# Patient Record
Sex: FEMALE | Race: WHITE | NOT HISPANIC OR LATINO | Employment: STUDENT | URBAN - METROPOLITAN AREA
[De-identification: names, ages, dates, MRNs, and addresses within clinical notes are randomized per-mention and may not be internally consistent; named-entity substitution may affect disease eponyms.]

---
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
## 2024-10-14 ENCOUNTER — CLINICAL SUPPORT (OUTPATIENT)
Dept: REHABILITATION | Facility: HOSPITAL | Age: 19
End: 2024-10-14
Payer: MEDICAID

## 2024-10-14 DIAGNOSIS — R29.898 DECREASED STRENGTH OF LOWER EXTREMITY: ICD-10-CM

## 2024-10-14 DIAGNOSIS — M53.86 DECREASED ROM OF LUMBAR SPINE: Primary | ICD-10-CM

## 2024-10-14 DIAGNOSIS — R68.89 DECREASED FUNCTIONAL ACTIVITY TOLERANCE: ICD-10-CM

## 2024-10-14 PROCEDURE — 97110 THERAPEUTIC EXERCISES: CPT | Mod: PN

## 2024-10-14 PROCEDURE — 97161 PT EVAL LOW COMPLEX 20 MIN: CPT | Mod: PN
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
## 2024-10-17 NOTE — PROGRESS NOTES
[facility]   Physical Therapy Treatment Note      Name: Yaneth Roman  Clinic Number: [id number]    Therapy Diagnosis:   Encounter Diagnoses   Name Primary?    Decreased ROM of lumbar spine Yes    Decreased strength of lower extremity     Decreased functional activity tolerance      Physician: Corazon Seymour MD    Visit Date: 10/21/2024    Physician Orders: PT Eval and Treat  Medical Diagnosis from Referral: M54.50 (ICD-10-CM) - Low back pain   Evaluation Date: 10/14/2024  Authorization Period Expiration: 12/31/24  Plan of Care Expiration: 12/14/2024  Progress Note Due: 11/14/2024  Visit # / Visits authorized: 1 / 12 (1/1Eval)  FOTO: 1/3 (last performed on 10/14/2024)     Precautions: Standard and Asthma     Time In: 12:30 PM  Time Out: 1:25 PM  Total Billable Time (timed & untimed codes): 55 minutes    PTA Visit #: 0/5     Subjective     Patient reports: that she is having a pretty good day today so far. The exercises did help some. Pain in still across the lower back on both sides.    She was compliant with home exercise program.  Response to previous treatment: no adverse reactions  Functional change: in progress - first follow up appointment    Pain: 4/10  Location: bilateral lower back      Objective      Objective Measures updated at progress report unless specified.     Treatment     Yaneth received the treatments listed below:      Yaneth received therapeutic exercises to develop strength, endurance, ROM, flexibility, posture, and core stabilization for 55 minutes including:     Recumbent Bike x5 min (Level 2)  Shuttle Press Double Legs x30 reps (1 black cord, 1 red cord)  Shuttle Press Single Leg x30 reps each (1 red cord)  Supine Pelvic Tilts 3x10 reps B (3s holds)  Supine Hip Bridge 3x10 reps  Supine Piriformis Rocks 5x10s B  Supine Hamstring Stretch 10x10s B + strap  Supine LTR x10 reps + Stability Ball   Seated Figure 4 Stretch 5x10s B  Seated Lumbar Flexion x5  reps  Seated Stability Ball Roll Outs x2 min   Side-Lying Open Books B x10 reps each  Side-Lying Clamshells 2x10 reps B     Plan for Next Visit: Dry Needling, STM      Supervised modalities after being cleared for contradictions: IFC Electrical Stimulation:  Yaneth received IFC Electrical Stimulation for pain control applied to the Lower Back. Pt received stimulation at 100% scan for 8 minutes post treatment session. Yaneth tolderated treatment well without any adverse effects.  LE's elevated on wedge for comfort.    Patient received a hot pack for 8 minutes to the lower back post treatment session in combination with IFC-electrical stimulation to assist with pain relief.      *A portion of this treatment session is provided with the assistance of a skilled rehbailitation technician under the supervision of a licensed physical therapist.    Patient Education and Home Exercises       Education provided:   Home exercise program review  Post Exercise Soreness  Anatomy/Physiology of the Lumbar Spine and the surrounding musculature    Written Home Exercises Provided: Pt instructed to continue prior HEP. Exercises were reviewed and Yaneth was able to demonstrate them prior to the end of the session.  Yaneth demonstrated good  understanding of the education provided. See Electronic Medical Record under Patient Instructions for exercises provided during therapy sessions    Assessment     Emphasis placed on maintaining a pain free range of motion. Introduced posterior pelvic tilt to activate the core and protect the lumbar spine with movement. Encouraged patient to continue with Home Exercise Program daily. Dry needling may be introduced next session.    Yaneth Is progressing well towards her goals.   Patient prognosis is Good.     Patient will continue to benefit from skilled outpatient physical therapy to address the deficits listed in the problem list box on initial evaluation, provide pt/family education and to maximize  pt's level of independence in the home and community environment.     Patient's spiritual, cultural and educational needs considered and pt agreeable to plan of care and goals.     Anticipated barriers to physical therapy: none    Goals:   Reviewed: 10/14/2024    Short Term Goals: In 4 weeks  Progress Date   1.Patient to be educated on HEP. [x] Progressing  [] MET   [] Not MET   [] Not tested     2.Patient to increase right side bending range of motion to 25 degrees, in order to improve available range of motion for ADL's.  [x] Progressing  [] MET   [] Not MET  [] Not tested     3.Patient to increase hip abduction strength by 1/2 grade, in order to improve endurance and increase ability to perform all functional activities for increased time.  [x] Progressing  [] MET   [] Not MET  [] Not tested     4.Patient to have pain less than 4/10 at worst, to improve QOL. [x] Progressing  [] MET   [] Not MET  [] Not tested     5.Patient to improve score on the FOTO, to improve QOL. [x] Progressing  [] MET   [] Not MET  [] Not tested        Long Term Goals: In 8 weeks Progress Date   1.Patient to improve score on the FOTO predicted score or better, to improve QOL. [x] Progressing  [] MET   [] Not MET  [] Not tested     2. Patient to increase LE strength, especially into hip extension to at least 4+/5 or greater, in order to improve endurance and increase ability to perform all functional activities for increased time. [x] Progressing  [] MET   [] Not MET  [] Not tested     3. Patient to have decreased pain to 2/10 at worst, to improve QOL. [x] Progressing  [] MET   [] Not MET  [] Not tested     5. Patient to perform daily activities including sitting and standing for at least 30 minutes without increased symptoms. [x] Progressing  [] MET   [] Not MET  [] Not tested       Plan     Continue with established POC for improved ability to perform ADL's    Cally Medina, PT, DPT, Cert. DN

## 2024-10-21 ENCOUNTER — CLINICAL SUPPORT (OUTPATIENT)
Dept: REHABILITATION | Facility: HOSPITAL | Age: 19
End: 2024-10-21
Payer: MEDICAID

## 2024-10-21 DIAGNOSIS — R29.898 DECREASED STRENGTH OF LOWER EXTREMITY: ICD-10-CM

## 2024-10-21 DIAGNOSIS — M53.86 DECREASED ROM OF LUMBAR SPINE: Primary | ICD-10-CM

## 2024-10-21 DIAGNOSIS — R68.89 DECREASED FUNCTIONAL ACTIVITY TOLERANCE: ICD-10-CM

## 2024-10-21 PROCEDURE — 97110 THERAPEUTIC EXERCISES: CPT | Mod: PN

## 2024-10-23 NOTE — PROGRESS NOTES
[facility]   Physical Therapy Treatment Note      Name: Yaneth Roman  Clinic Number: [id number]    Therapy Diagnosis:   Encounter Diagnoses   Name Primary?    Decreased ROM of lumbar spine Yes    Decreased strength of lower extremity     Decreased functional activity tolerance      Physician: Corazon Seymour MD    Visit Date: 10/25/2024    Physician Orders: PT Eval and Treat  Medical Diagnosis from Referral: M54.50 (ICD-10-CM) - Low back pain   Evaluation Date: 10/14/2024  Authorization Period Expiration: 12/31/24  Plan of Care Expiration: 12/14/2024  Progress Note Due: 11/14/2024  Visit # / Visits authorized: 2 / 12 (1/1Eval)  FOTO: 1/3 (last performed on 10/14/2024)     Precautions: Standard and Asthma     Time In: 9:00 AM  Time Out: 9:55 AM  Total Billable Time (timed & untimed codes): 55 minutes    PTA Visit #: 0/5     Subjective     Patient reports: that she had some pain in the lower back and upper back after last session.    She was compliant with home exercise program.  Response to previous treatment: no adverse reactions  Functional change: in progress    Pain: 2/10  Location: bilateral lower back      Objective      Objective Measures updated at progress report unless specified.     Treatment     Yaneth received the treatments listed below:      Yaneth received therapeutic exercises to develop strength, endurance, ROM, flexibility, posture, and core stabilization for 45 minutes including:     Recumbent Bike x5 min (Level 2)  Shuttle Press Double Legs x30 reps (1 black cord, 1 red cord)  Shuttle Press Single Leg x30 reps each (1 red cord)  Supine Pelvic Tilts 3x10 reps B (3s holds)  Supine Hip Bridge 3x10 reps  Supine Piriformis Rocks 5x10s B  Supine Hamstring Stretch 10x10s B + strap  Supine LTR x10 reps + Stability Ball   Seated Stability Ball Roll Outs x2 min   Side-Lying Clamshells 2x10 reps B + YTB    Pt received Manual Therapy techniques in the form of Dry Needling  for x15 min. This was applied to the Lumbar Spine B at L3-L5. Dry needling was performed to decrease inflammation, increase circulation, decrease pain and restore homeostasis.      50 mm needles with 30 mm gauge were used for all insertion points. Patient prone lying for comfort. No adverse reactions observed.    Electrical Stimulation was applied this date while needles were in situ x5 minutes. Intensity increased to patient tolerance. No adverse reactions noted.    Patient gave Written and Verbal consent to undergo dry needling. Written consent can be found in the media section in pts chart. All needles were removed and changes in signs and symptoms were assessed. No adverse reactions noted at the conclusion of the treatment.        Plan for Next Visit: Dry Needling, STM      Supervised modalities after being cleared for contradictions: IFC Electrical Stimulation:  Yaneth received IFC Electrical Stimulation for pain control applied to the Lower Back. Pt received stimulation at 100% scan for 8 minutes post treatment session. Yaneth tolderated treatment well without any adverse effects.  LE's elevated on wedge for comfort.    Patient received a hot pack for 5 minutes to the lower back post treatment session in combination with IFC-electrical stimulation to assist with pain relief. LE's over wedge for comfort.      *A portion of this treatment session is provided with the assistance of a skilled rehbailitation technician under the supervision of a licensed physical therapist.    Patient Education and Home Exercises       Education provided:   Home exercise program review  Post Exercise Soreness  Anatomy/Physiology of the Lumbar Spine and the surrounding musculature    Written Home Exercises Provided: Pt instructed to continue prior HEP. Exercises were reviewed and Yaneth was able to demonstrate them prior to the end of the session.  Yaneth demonstrated good  understanding of the education provided. See Electronic  Medical Record under Patient Instructions for exercises provided during therapy sessions    Assessment     Patient tolerated therapy fairly well overall. Continues to have some pain in the lower back bilaterally. Dry needling was introduced with emphasis on tissue extensibility and pain relief. Skin was cleaned pre and post needle insertion/removal with no adverse reactions observed. Will continue to progress patient as able next session.    Yaneth Is progressing well towards her goals.   Patient prognosis is Good.     Patient will continue to benefit from skilled outpatient physical therapy to address the deficits listed in the problem list box on initial evaluation, provide pt/family education and to maximize pt's level of independence in the home and community environment.     Patient's spiritual, cultural and educational needs considered and pt agreeable to plan of care and goals.     Anticipated barriers to physical therapy: none    Goals:   Reviewed: 10/14/2024    Short Term Goals: In 4 weeks  Progress Date   1.Patient to be educated on HEP. [x] Progressing  [] MET   [] Not MET   [] Not tested     2.Patient to increase right side bending range of motion to 25 degrees, in order to improve available range of motion for ADL's.  [x] Progressing  [] MET   [] Not MET  [] Not tested     3.Patient to increase hip abduction strength by 1/2 grade, in order to improve endurance and increase ability to perform all functional activities for increased time.  [x] Progressing  [] MET   [] Not MET  [] Not tested     4.Patient to have pain less than 4/10 at worst, to improve QOL. [x] Progressing  [] MET   [] Not MET  [] Not tested     5.Patient to improve score on the FOTO, to improve QOL. [x] Progressing  [] MET   [] Not MET  [] Not tested        Long Term Goals: In 8 weeks Progress Date   1.Patient to improve score on the FOTO predicted score or better, to improve QOL. [x] Progressing  [] MET   [] Not MET  [] Not tested      2. Patient to increase LE strength, especially into hip extension to at least 4+/5 or greater, in order to improve endurance and increase ability to perform all functional activities for increased time. [x] Progressing  [] MET   [] Not MET  [] Not tested     3. Patient to have decreased pain to 2/10 at worst, to improve QOL. [x] Progressing  [] MET   [] Not MET  [] Not tested     5. Patient to perform daily activities including sitting and standing for at least 30 minutes without increased symptoms. [x] Progressing  [] MET   [] Not MET  [] Not tested       Plan     Continue with established POC for improved ability to perform ADL's    Cally Medina, PT, DPT, Cert. DN

## 2024-10-25 ENCOUNTER — CLINICAL SUPPORT (OUTPATIENT)
Dept: REHABILITATION | Facility: HOSPITAL | Age: 19
End: 2024-10-25
Payer: MEDICAID

## 2024-10-25 DIAGNOSIS — M53.86 DECREASED ROM OF LUMBAR SPINE: Primary | ICD-10-CM

## 2024-10-25 DIAGNOSIS — R68.89 DECREASED FUNCTIONAL ACTIVITY TOLERANCE: ICD-10-CM

## 2024-10-25 DIAGNOSIS — R29.898 DECREASED STRENGTH OF LOWER EXTREMITY: ICD-10-CM

## 2024-10-25 PROCEDURE — 97110 THERAPEUTIC EXERCISES: CPT | Mod: PN

## 2024-10-29 ENCOUNTER — CLINICAL SUPPORT (OUTPATIENT)
Dept: REHABILITATION | Facility: HOSPITAL | Age: 19
End: 2024-10-29
Payer: MEDICAID

## 2024-10-29 DIAGNOSIS — R68.89 DECREASED FUNCTIONAL ACTIVITY TOLERANCE: ICD-10-CM

## 2024-10-29 DIAGNOSIS — R29.898 DECREASED STRENGTH OF LOWER EXTREMITY: ICD-10-CM

## 2024-10-29 DIAGNOSIS — M53.86 DECREASED ROM OF LUMBAR SPINE: Primary | ICD-10-CM

## 2024-10-29 PROCEDURE — 97110 THERAPEUTIC EXERCISES: CPT | Mod: PN

## 2024-10-31 NOTE — PROGRESS NOTES
[facility]   Physical Therapy Treatment Note      Name: Yaneth Roman  Clinic Number: [id number]    Therapy Diagnosis:   Encounter Diagnoses   Name Primary?    Decreased ROM of lumbar spine Yes    Decreased strength of lower extremity     Decreased functional activity tolerance      Physician: Corazon Seymour MD    Visit Date: 11/4/2024    Physician Orders: PT Eval and Treat  Medical Diagnosis from Referral: M54.50 (ICD-10-CM) - Low back pain   Evaluation Date: 10/14/2024  Authorization Period Expiration: 12/31/24  Plan of Care Expiration: 12/14/2024  Progress Note Due: 11/14/2024  Visit # / Visits authorized: 4 / 12 (1/1Eval)  FOTO: 1/3 (last performed on 10/14/2024)     Precautions: Standard and Asthma     Time In: 2:30 PM  Time Out: 2:23 PM  Total Billable Time (timed & untimed codes): 53 minutes    PTA Visit #: 0/5     Subjective     Patient reports: that she is having more back pain today - did a lot of sitting in the center of the back. The cupping has helped.     She was compliant with home exercise program.  Response to previous treatment: no adverse reactions  Functional change: in progress    Pain: 7/10  Location: bilateral lower back      Objective      Objective Measures updated at progress report unless specified.     Treatment     Yaneth received the treatments listed below:      Yaneth received therapeutic exercises to develop strength, endurance, ROM, flexibility, posture, and core stabilization for 53 minutes including:     Recumbent Bike x5 min (Level 4)  Shuttle Press Double Legs x30 reps (1 black cord, 1 red cord)  Shuttle Press Single Leg x30 reps each (1 red cord)  Standing Lat Pull Downs 2x10 reps + GTB  Supine Pelvic Tilts 3x10 reps B (3s holds) + BKFO  Supine Hip Bridge 3x10 reps + iso hip abduction  Supine Piriformis Rocks 5x10s B  Supine Hamstring Stretch 5x10s B + strap  Supine LTR x10 reps + Stability Ball   Seated Stability Ball Roll Outs x2 min    Side-Lying Clamshells 2x10 reps B + YTB  Cupping and STM to the Lumbar paraspinals - pillow under hips         Plan for Next Visit: Dry Needling, STM        Patient received a hot pack for 8 minutes to the lower back post treatment session to assist with pain relief. LE's over wedge for comfort.      *A portion of this treatment session is provided with the assistance of a skilled rehbailitation technician under the supervision of a licensed physical therapist.    Patient Education and Home Exercises       Education provided:   Home exercise program review  Post Exercise Soreness  Anatomy/Physiology of the Lumbar Spine and the surrounding musculature    Written Home Exercises Provided: Pt instructed to continue prior HEP. Exercises were reviewed and Yaneth was able to demonstrate them prior to the end of the session.  Yaneth demonstrated good  understanding of the education provided. See Electronic Medical Record under Patient Instructions for exercises provided during therapy sessions    Assessment     Patient tolerated therapy fairly well overall this afternoon. Cupping continues to yield a positive response - noted mild to moderate TTP along the lower lumbar paraspinals. Encouraged patient to continue with Home Exercise Program daily.     Yaneth Is progressing well towards her goals.   Patient prognosis is Good.     Patient will continue to benefit from skilled outpatient physical therapy to address the deficits listed in the problem list box on initial evaluation, provide pt/family education and to maximize pt's level of independence in the home and community environment.     Patient's spiritual, cultural and educational needs considered and pt agreeable to plan of care and goals.     Anticipated barriers to physical therapy: none    Goals:   Reviewed: 10/14/2024    Short Term Goals: In 4 weeks  Progress Date   1.Patient to be educated on HEP. [x] Progressing  [] MET   [] Not MET   [] Not tested     2.Patient  to increase right side bending range of motion to 25 degrees, in order to improve available range of motion for ADL's.  [x] Progressing  [] MET   [] Not MET  [] Not tested     3.Patient to increase hip abduction strength by 1/2 grade, in order to improve endurance and increase ability to perform all functional activities for increased time.  [x] Progressing  [] MET   [] Not MET  [] Not tested     4.Patient to have pain less than 4/10 at worst, to improve QOL. [x] Progressing  [] MET   [] Not MET  [] Not tested     5.Patient to improve score on the FOTO, to improve QOL. [x] Progressing  [] MET   [] Not MET  [] Not tested        Long Term Goals: In 8 weeks Progress Date   1.Patient to improve score on the FOTO predicted score or better, to improve QOL. [x] Progressing  [] MET   [] Not MET  [] Not tested     2. Patient to increase LE strength, especially into hip extension to at least 4+/5 or greater, in order to improve endurance and increase ability to perform all functional activities for increased time. [x] Progressing  [] MET   [] Not MET  [] Not tested     3. Patient to have decreased pain to 2/10 at worst, to improve QOL. [x] Progressing  [] MET   [] Not MET  [] Not tested     5. Patient to perform daily activities including sitting and standing for at least 30 minutes without increased symptoms. [x] Progressing  [] MET   [] Not MET  [] Not tested       Plan     Continue with established POC for improved ability to perform ADL's    Cally Mdeina, PT, DPT, Cert. DN

## 2024-11-04 ENCOUNTER — CLINICAL SUPPORT (OUTPATIENT)
Dept: REHABILITATION | Facility: HOSPITAL | Age: 19
End: 2024-11-04
Payer: MEDICAID

## 2024-11-04 DIAGNOSIS — M53.86 DECREASED ROM OF LUMBAR SPINE: Primary | ICD-10-CM

## 2024-11-04 DIAGNOSIS — R68.89 DECREASED FUNCTIONAL ACTIVITY TOLERANCE: ICD-10-CM

## 2024-11-04 DIAGNOSIS — R29.898 DECREASED STRENGTH OF LOWER EXTREMITY: ICD-10-CM

## 2024-11-04 PROCEDURE — 97110 THERAPEUTIC EXERCISES: CPT | Mod: PN

## 2024-11-05 NOTE — PROGRESS NOTES
[facility]   Physical Therapy Treatment Note      Name: Yaneth Roman  Clinic Number: [id number]    Therapy Diagnosis:   Encounter Diagnoses   Name Primary?    Decreased ROM of lumbar spine Yes    Decreased strength of lower extremity     Decreased functional activity tolerance      Physician: Corazon Seymour MD    Visit Date: 11/6/2024    Physician Orders: PT Eval and Treat  Medical Diagnosis from Referral: M54.50 (ICD-10-CM) - Low back pain   Evaluation Date: 10/14/2024  Authorization Period Expiration: 12/31/24  Plan of Care Expiration: 12/14/2024  Progress Note Due: 11/14/2024  Visit # / Visits authorized: 5 / 12 (1/1Eval)  FOTO: 1/3 (last performed on 10/14/2024)     Precautions: Standard and Asthma     Time In: 3:00 PM  Time Out: 3:53 PM  Total Billable Time (timed & untimed codes): 53 minutes    PTA Visit #: 0/5     Subjective     Patient reports: that she did a lot of sitting yesterday but she is not having any pain this afternoon. Feels like the symptoms are getting better in general.    She was compliant with home exercise program.  Response to previous treatment: no adverse reactions  Functional change: in progress    Pain: 0/10  Location: bilateral lower back      Objective      Objective Measures updated at progress report unless specified.     Treatment     Yaneth received the treatments listed below:      Yaneth received therapeutic exercises to develop strength, endurance, ROM, flexibility, posture, and core stabilization for 53 minutes including:     Recumbent Bike x6 min (Level 4)  Shuttle Press Double Legs x30 reps (1 black cord, 1 red cord)  Shuttle Press Single Leg x30 reps each (1 red cord)  Standing Lat Pull Downs 3x10 reps + GTB  Quadruped PPT x20 reps (3s holds)  Bird Dog x10 reps B  Supine Pelvic Tilts 2x10 reps B (3s holds) + BKFO + RTB  Supine Hip Bridge 2x10 reps + hip abduction + RTB  Supine Piriformis Rocks 5x10s B  Supine Hamstring Stretch 5x10s B +  strap  Supine LTR x10 reps + Stability Ball   Cupping and STM to the Lumbar paraspinals - pillow under hips         Plan for Next Visit: Dry Needling, STM        Patient received a hot pack for 8 minutes to the lower back post treatment session to assist with pain relief. LE's over wedge for comfort.      *A portion of this treatment session is provided with the assistance of a skilled rehbailitation technician under the supervision of a licensed physical therapist.    Patient Education and Home Exercises       Education provided:   Home exercise program review  Post Exercise Soreness  Anatomy/Physiology of the Lumbar Spine and the surrounding musculature    Written Home Exercises Provided: Pt instructed to continue prior HEP. Exercises were reviewed and Yaneth was able to demonstrate them prior to the end of the session.  Yaneth demonstrated good  understanding of the education provided. See Electronic Medical Record under Patient Instructions for exercises provided during therapy sessions    Assessment     Patient continues to tolerate therapy well. Introduced quadruped exercises with emphasis on greater core engagement and control. Lumbopelvic mobility improved with increased reps. Good tolerance to cupping again this date - noted improved tissue extensibility overall.    Yaneth Is progressing well towards her goals.   Patient prognosis is Good.     Patient will continue to benefit from skilled outpatient physical therapy to address the deficits listed in the problem list box on initial evaluation, provide pt/family education and to maximize pt's level of independence in the home and community environment.     Patient's spiritual, cultural and educational needs considered and pt agreeable to plan of care and goals.     Anticipated barriers to physical therapy: none    Goals:   Reviewed: 10/14/2024    Short Term Goals: In 4 weeks  Progress Date   1.Patient to be educated on HEP. [x] Progressing  [] MET   [] Not  MET   [] Not tested     2.Patient to increase right side bending range of motion to 25 degrees, in order to improve available range of motion for ADL's.  [x] Progressing  [] MET   [] Not MET  [] Not tested     3.Patient to increase hip abduction strength by 1/2 grade, in order to improve endurance and increase ability to perform all functional activities for increased time.  [x] Progressing  [] MET   [] Not MET  [] Not tested     4.Patient to have pain less than 4/10 at worst, to improve QOL. [x] Progressing  [] MET   [] Not MET  [] Not tested     5.Patient to improve score on the FOTO, to improve QOL. [x] Progressing  [] MET   [] Not MET  [] Not tested        Long Term Goals: In 8 weeks Progress Date   1.Patient to improve score on the FOTO predicted score or better, to improve QOL. [x] Progressing  [] MET   [] Not MET  [] Not tested     2. Patient to increase LE strength, especially into hip extension to at least 4+/5 or greater, in order to improve endurance and increase ability to perform all functional activities for increased time. [x] Progressing  [] MET   [] Not MET  [] Not tested     3. Patient to have decreased pain to 2/10 at worst, to improve QOL. [x] Progressing  [] MET   [] Not MET  [] Not tested     5. Patient to perform daily activities including sitting and standing for at least 30 minutes without increased symptoms. [x] Progressing  [] MET   [] Not MET  [] Not tested       Plan     Continue with established POC for improved ability to perform ADL's    Cally Medina, PT, DPT, Cert. DN

## 2024-11-06 ENCOUNTER — CLINICAL SUPPORT (OUTPATIENT)
Dept: REHABILITATION | Facility: HOSPITAL | Age: 19
End: 2024-11-06
Payer: MEDICAID

## 2024-11-06 DIAGNOSIS — R68.89 DECREASED FUNCTIONAL ACTIVITY TOLERANCE: ICD-10-CM

## 2024-11-06 DIAGNOSIS — R29.898 DECREASED STRENGTH OF LOWER EXTREMITY: ICD-10-CM

## 2024-11-06 DIAGNOSIS — M53.86 DECREASED ROM OF LUMBAR SPINE: Primary | ICD-10-CM

## 2024-11-06 PROCEDURE — 97110 THERAPEUTIC EXERCISES: CPT | Mod: PN

## 2024-11-10 NOTE — PROGRESS NOTES
[facility]   Physical Therapy Treatment Note      Name: Yaneth Roman  Clinic Number: [id number]    Therapy Diagnosis:   Encounter Diagnoses   Name Primary?    Decreased ROM of lumbar spine Yes    Decreased strength of lower extremity     Decreased functional activity tolerance      Physician: Corazon Seymour MD    Visit Date: 11/12/2024    Physician Orders: PT Eval and Treat  Medical Diagnosis from Referral: M54.50 (ICD-10-CM) - Low back pain   Evaluation Date: 10/14/2024  Authorization Period Expiration: 12/31/24  Plan of Care Expiration: 12/14/2024  Progress Note Due: 11/14/2024  Visit # / Visits authorized: 6 / 12 (1/1Eval)  FOTO: 1/3 (last performed on 10/14/2024)     Precautions: Standard and Asthma     Time In: 7:00 AM  Time Out: 7:53 AM  Total Billable Time (timed & untimed codes): 53 minutes    PTA Visit #: 0/5     Subjective     Patient reports: that she can tell her pain is getting better overall. Only really having pain first thing in the morning and sometimes before bed at night.     She was compliant with home exercise program.  Response to previous treatment: no adverse reactions  Functional change: in progress    Pain: 3/10  Location: bilateral lower back      Objective      Objective Measures updated at progress report unless specified.     Treatment     Yaneth received the treatments listed below:      Yaneth received therapeutic exercises to develop strength, endurance, ROM, flexibility, posture, and core stabilization for 53 minutes including:     Recumbent Bike x5 min (Level 4)  Shuttle Press Double Legs x30 reps (1 black cord, 1 red cord)  Shuttle Press Single Leg x30 reps each (1 red cord)  Side-Lying Clamshells 2x10 reps B + RTB  Quadruped PPT x20 reps (3s holds)  Quadruped Bird Dog x10 reps B  Supine PPT 2x10 reps + LE March + RTB  Supine Hip Bridge 2x10 reps + hip abduction + RTB  Supine Piriformis Rocks 5x10s B  Supine Hamstring Stretch 5x10s B +  strap  Supine LTR x10 reps + Stability Ball   Cupping and STM to the Lumbar paraspinals - pillow under hips    Plan for Next Visit:         Patient received a hot pack for 8 minutes to the lower back post treatment session to assist with pain relief. Prone lying for comfort with pillow under hips.      *A portion of this treatment session is provided with the assistance of a skilled rehbailitation technician under the supervision of a licensed physical therapist.    Patient Education and Home Exercises       Education provided:   Home exercise program review  Post Exercise Soreness  Anatomy/Physiology of the Lumbar Spine and the surrounding musculature    Written Home Exercises Provided: Pt instructed to continue prior HEP. Exercises were reviewed and Yaneth was able to demonstrate them prior to the end of the session.  Yaneth demonstrated good  understanding of the education provided. See Electronic Medical Record under Patient Instructions for exercises provided during therapy sessions    Assessment     Patient with good overall tolerance to exercise this date. Emphasis placed on tissue extensibility and core strengthening through activity. Neutral spine maintained after initial cues with bird dog's. Will continue to progress patient as able next session.    Yaneth Is progressing well towards her goals.   Patient prognosis is Good.     Patient will continue to benefit from skilled outpatient physical therapy to address the deficits listed in the problem list box on initial evaluation, provide pt/family education and to maximize pt's level of independence in the home and community environment.     Patient's spiritual, cultural and educational needs considered and pt agreeable to plan of care and goals.     Anticipated barriers to physical therapy: none    Goals:   Reviewed: 10/14/2024    Short Term Goals: In 4 weeks  Progress Date   1.Patient to be educated on HEP. [x] Progressing  [] MET   [] Not MET   [] Not  tested     2.Patient to increase right side bending range of motion to 25 degrees, in order to improve available range of motion for ADL's.  [x] Progressing  [] MET   [] Not MET  [] Not tested     3.Patient to increase hip abduction strength by 1/2 grade, in order to improve endurance and increase ability to perform all functional activities for increased time.  [x] Progressing  [] MET   [] Not MET  [] Not tested     4.Patient to have pain less than 4/10 at worst, to improve QOL. [x] Progressing  [] MET   [] Not MET  [] Not tested     5.Patient to improve score on the FOTO, to improve QOL. [x] Progressing  [] MET   [] Not MET  [] Not tested        Long Term Goals: In 8 weeks Progress Date   1.Patient to improve score on the FOTO predicted score or better, to improve QOL. [x] Progressing  [] MET   [] Not MET  [] Not tested     2. Patient to increase LE strength, especially into hip extension to at least 4+/5 or greater, in order to improve endurance and increase ability to perform all functional activities for increased time. [x] Progressing  [] MET   [] Not MET  [] Not tested     3. Patient to have decreased pain to 2/10 at worst, to improve QOL. [x] Progressing  [] MET   [] Not MET  [] Not tested     5. Patient to perform daily activities including sitting and standing for at least 30 minutes without increased symptoms. [x] Progressing  [] MET   [] Not MET  [] Not tested       Plan     Continue with established POC for improved ability to perform ADL's    Cally Medina, PT, DPT, Cert. DN

## 2024-11-12 ENCOUNTER — CLINICAL SUPPORT (OUTPATIENT)
Dept: REHABILITATION | Facility: HOSPITAL | Age: 19
End: 2024-11-12
Payer: MEDICAID

## 2024-11-12 DIAGNOSIS — M53.86 DECREASED ROM OF LUMBAR SPINE: Primary | ICD-10-CM

## 2024-11-12 DIAGNOSIS — R68.89 DECREASED FUNCTIONAL ACTIVITY TOLERANCE: ICD-10-CM

## 2024-11-12 DIAGNOSIS — R29.898 DECREASED STRENGTH OF LOWER EXTREMITY: ICD-10-CM

## 2024-11-12 PROCEDURE — 97110 THERAPEUTIC EXERCISES: CPT | Mod: PN

## 2024-11-12 NOTE — PROGRESS NOTES
[facility]   Physical Therapy Treatment Note      Name: Yaneth Roman  Clinic Number: [id number]    Therapy Diagnosis:   Encounter Diagnoses   Name Primary?    Decreased ROM of lumbar spine Yes    Decreased strength of lower extremity     Decreased functional activity tolerance      Physician: Corazon Seymour MD    Visit Date: 11/13/2024    Physician Orders: PT Eval and Treat  Medical Diagnosis from Referral: M54.50 (ICD-10-CM) - Low back pain   Evaluation Date: 10/14/2024  Authorization Period Expiration: 12/31/24  Plan of Care Expiration: 12/14/2024  Progress Note Due: 11/14/2024  Visit # / Visits authorized: 7 / 12 (1/1Eval)  FOTO: 2/3 (last performed on 10/14/2024, 11/13/24)     Precautions: Standard and Asthma     Time In: 3:00 PM  Time Out: 3:45 PM  Total Billable Time (timed & untimed codes): 45 minutes    PTA Visit #: 0/5     Subjective     Patient reports: that she is a little sore from yesterday with mild pain/discomfort. Pain in the back is more on the left side today.    She was compliant with home exercise program.  Response to previous treatment: no adverse reactions  Functional change: in progress    Pain: 4/10  Location: bilateral lower back (L>R today)    Objective      Objective Measures updated at progress report unless specified.     FOTO: 60    Treatment     Yaneth received the treatments listed below:      Yaneth received therapeutic exercises to develop strength, endurance, ROM, flexibility, posture, and core stabilization for 45 minutes including:     Recumbent Bike x5 min (Level 4)  Shuttle Press Double Legs x30 reps (1 black cord, 1 red cord)  Shuttle Press Single Leg x30 reps each (1 red cord)  Quadruped PPT x20 reps (3s holds)  Quadruped Bird Dog x10 reps B  Quadruped Fire Hydrants x10 reps B  Quadruped Donkey Kicks x10 reps B  Supine Piriformis Rocks 5x10s B  Cupping and STM to the Lumbar paraspinals - pillow under hips    Plan for Next Visit:          Patient received a hot pack for 8 minutes to the lower back post treatment session to assist with pain relief. Prone lying for comfort with pillow under hips.      *A portion of this treatment session is provided with the assistance of a skilled rehbailitation technician under the supervision of a licensed physical therapist.    Patient Education and Home Exercises       Education provided:   Home exercise program review  Post Exercise Soreness  Anatomy/Physiology of the Lumbar Spine and the surrounding musculature    Written Home Exercises Provided: Pt instructed to continue prior HEP. Exercises were reviewed and Yaneth was able to demonstrate them prior to the end of the session.  Yaneth demonstrated good  understanding of the education provided. See Electronic Medical Record under Patient Instructions for exercises provided during therapy sessions    Assessment     Introduced quadruped progressions with emphasis on glute, hip, and core activation while maintaining a neutral spine. Core fatigue noted with quadruped exercises but good tolerance noted overall. Noted improved FOTO score indicative of increased ability to perform functional activities.    Yaneth Is progressing well towards her goals.   Patient prognosis is Good.     Patient will continue to benefit from skilled outpatient physical therapy to address the deficits listed in the problem list box on initial evaluation, provide pt/family education and to maximize pt's level of independence in the home and community environment.     Patient's spiritual, cultural and educational needs considered and pt agreeable to plan of care and goals.     Anticipated barriers to physical therapy: none    Goals:   Reviewed: 10/14/2024    Short Term Goals: In 4 weeks  Progress Date   1.Patient to be educated on HEP. [x] Progressing  [] MET   [] Not MET   [] Not tested     2.Patient to increase right side bending range of motion to 25 degrees, in order to improve  available range of motion for ADL's.  [x] Progressing  [] MET   [] Not MET  [] Not tested     3.Patient to increase hip abduction strength by 1/2 grade, in order to improve endurance and increase ability to perform all functional activities for increased time.  [x] Progressing  [] MET   [] Not MET  [] Not tested     4.Patient to have pain less than 4/10 at worst, to improve QOL. [x] Progressing  [] MET   [] Not MET  [] Not tested     5.Patient to improve score on the FOTO, to improve QOL. [] Progressing  [x] MET   [] Not MET  [] Not tested  11/13/24      Long Term Goals: In 8 weeks Progress Date   1.Patient to improve score on the FOTO predicted score or better, to improve QOL. [x] Progressing  [] MET   [] Not MET  [] Not tested     2. Patient to increase LE strength, especially into hip extension to at least 4+/5 or greater, in order to improve endurance and increase ability to perform all functional activities for increased time. [x] Progressing  [] MET   [] Not MET  [] Not tested     3. Patient to have decreased pain to 2/10 at worst, to improve QOL. [x] Progressing  [] MET   [] Not MET  [] Not tested     5. Patient to perform daily activities including sitting and standing for at least 30 minutes without increased symptoms. [x] Progressing  [] MET   [] Not MET  [] Not tested       Plan     Continue with established POC for improved ability to perform ADL's    Cally Medina, PT, DPT, Cert. DN

## 2024-11-13 ENCOUNTER — CLINICAL SUPPORT (OUTPATIENT)
Dept: REHABILITATION | Facility: HOSPITAL | Age: 19
End: 2024-11-13
Payer: MEDICAID

## 2024-11-13 DIAGNOSIS — M53.86 DECREASED ROM OF LUMBAR SPINE: Primary | ICD-10-CM

## 2024-11-13 DIAGNOSIS — R68.89 DECREASED FUNCTIONAL ACTIVITY TOLERANCE: ICD-10-CM

## 2024-11-13 DIAGNOSIS — R29.898 DECREASED STRENGTH OF LOWER EXTREMITY: ICD-10-CM

## 2024-11-13 PROCEDURE — 97110 THERAPEUTIC EXERCISES: CPT | Mod: PN

## 2024-11-14 NOTE — PROGRESS NOTES
[facility]   Physical Therapy Treatment Note      Name: Yaneth Roman  Clinic Number: [id number]    Therapy Diagnosis:   No diagnosis found.    Physician: Corazon Seymour MD    Visit Date: 11/18/2024    Physician Orders: PT Eval and Treat  Medical Diagnosis from Referral: M54.50 (ICD-10-CM) - Low back pain   Evaluation Date: 10/14/2024  Authorization Period Expiration: 12/31/24  Plan of Care Expiration: 12/14/2024  Progress Note Due: 11/14/2024  Visit # / Visits authorized: 8 / 12 (1/1Eval)  FOTO: 2/3 (last performed on 10/14/2024, 11/13/24)     Precautions: Standard and Asthma     Time In: 9:00 AM  Time Out: 9:45 AM  Total Billable Time (timed & untimed codes): 45 minutes    PTA Visit #: 0/5     Subjective     Patient reports: that she is having more tailbone pain this morning, especially when sitting straight up.    She was compliant with home exercise program.  Response to previous treatment: no adverse reactions  Functional change: in progress    Pain: 3/10  Location: bilateral lower back (L>R today)    Objective      Objective Measures updated at progress report unless specified.     Treatment     Yaneth received the treatments listed below:      Yaneth received therapeutic exercises to develop strength, endurance, ROM, flexibility, posture, and core stabilization for 45 minutes including:     Recumbent Bike x6 min (Level 4)  Supine Piriformis [place]ocks 5x10s B  Shuttle Press Double Legs x30 reps (1 black cord, 1 red cord)  Shuttle Press Single Leg x30 reps each (1 red cord)  Quadruped PPT x20 reps (3s holds)  Quadruped Bird Dog x10 reps B  Quadruped Fire Hydrants x10 reps B  Quadruped Donkey Kicks x10 reps B  Cupping and STM to the Lumbar paraspinals - pillow under hips    Plan for Next Visit:       Supervised modalities after being cleared for contradictions: IFC Electrical Stimulation:  Yaneth received IFC Electrical Stimulation for pain control applied to the Lower Back. Pt  received stimulation at 100% scan for 8 minutes post treatment session. Yaneth tolderated treatment well without any adverse effects.  LE's elevated on wedge for comfort.    Patient received a hot pack for 8 minutes to the lower back post treatment session in combination with IFC-electrical stimulation to assist with pain relief. Prone lying for comfort with pillow under hips.      *A portion of this treatment session is provided with the assistance of a skilled rehbailitation technician under the supervision of a licensed physical therapist.    Patient Education and Home Exercises       Education provided:   Home exercise program review  Post Exercise Soreness  Anatomy/Physiology of the Lumbar Spine and the surrounding musculature    Written Home Exercises Provided: Pt instructed to continue prior HEP. Exercises were reviewed and Yaneth was able to demonstrate them prior to the end of the session.  Yaneth demonstrated good  understanding of the education provided. See Electronic Medical Record under Patient Instructions for exercises provided during therapy sessions    Assessment     Patient with increased tailbone pain this date but was able to complete exercises as prescribed. Cupping and soft tissue mobilization to the lower lumbar paraspinals continues to feel beneficial. IFC electrical stimulation was also applied today post treatment session to further assist with pain relief and tissue extensibility. No adverse reactions reported. Patient reported decreased pain post treatment session. Plan to re-assess the patient next session.    Yaneth Is progressing well towards her goals.   Patient prognosis is Good.     Patient will continue to benefit from skilled outpatient physical therapy to address the deficits listed in the problem list box on initial evaluation, provide pt/family education and to maximize pt's level of independence in the home and community environment.     Patient's spiritual, cultural and  educational needs considered and pt agreeable to plan of care and goals.     Anticipated barriers to physical therapy: none    Goals:   Reviewed: 10/14/2024    Short Term Goals: In 4 weeks  Progress Date   1.Patient to be educated on HEP. [x] Progressing  [] MET   [] Not MET   [] Not tested     2.Patient to increase right side bending range of motion to 25 degrees, in order to improve available range of motion for ADL's.  [x] Progressing  [] MET   [] Not MET  [] Not tested     3.Patient to increase hip abduction strength by 1/2 grade, in order to improve endurance and increase ability to perform all functional activities for increased time.  [x] Progressing  [] MET   [] Not MET  [] Not tested     4.Patient to have pain less than 4/10 at worst, to improve QOL. [x] Progressing  [] MET   [] Not MET  [] Not tested     5.Patient to improve score on the FOTO, to improve QOL. [] Progressing  [x] MET   [] Not MET  [] Not tested  11/13/24      Long Term Goals: In 8 weeks Progress Date   1.Patient to improve score on the FOTO predicted score or better, to improve QOL. [x] Progressing  [] MET   [] Not MET  [] Not tested     2. Patient to increase LE strength, especially into hip extension to at least 4+/5 or greater, in order to improve endurance and increase ability to perform all functional activities for increased time. [x] Progressing  [] MET   [] Not MET  [] Not tested     3. Patient to have decreased pain to 2/10 at worst, to improve QOL. [x] Progressing  [] MET   [] Not MET  [] Not tested     5. Patient to perform daily activities including sitting and standing for at least 30 minutes without increased symptoms. [x] Progressing  [] MET   [] Not MET  [] Not tested       Plan     Continue with established POC for improved ability to perform ADL's    Cally Medina, PT, DPT, Cert. DN

## 2024-11-18 ENCOUNTER — CLINICAL SUPPORT (OUTPATIENT)
Dept: REHABILITATION | Facility: HOSPITAL | Age: 19
End: 2024-11-18
Payer: MEDICAID

## 2024-11-18 DIAGNOSIS — M53.86 DECREASED ROM OF LUMBAR SPINE: Primary | ICD-10-CM

## 2024-11-18 DIAGNOSIS — R29.898 DECREASED STRENGTH OF LOWER EXTREMITY: ICD-10-CM

## 2024-11-18 DIAGNOSIS — R68.89 DECREASED FUNCTIONAL ACTIVITY TOLERANCE: ICD-10-CM

## 2024-11-18 PROCEDURE — 97110 THERAPEUTIC EXERCISES: CPT | Mod: PN

## 2024-11-21 NOTE — PROGRESS NOTES
[facility]   Physical Therapy Treatment Note & Re-assessment     Name: Yaneth Roman  Clinic Number: [id number]    Therapy Diagnosis:   Encounter Diagnoses   Name Primary?    Decreased ROM of lumbar spine Yes    Decreased strength of lower extremity     Decreased functional activity tolerance      Physician: Corazon Seymour MD    Visit Date: 11/22/2024    Physician Orders: PT Eval and Treat  Medical Diagnosis from Referral: M54.50 (ICD-10-CM) - Low back pain   Evaluation Date: 10/14/2024  Authorization Period Expiration: 12/31/24  Plan of Care Expiration: 12/14/2024  Progress Note Due: 12/22/24  Visit # / Visits authorized: 9 / 12 (1/1Eval) (re-assessed on 11/22/24)  FOTO: 2/3 (last performed on 10/14/2024, 11/13/24)     Precautions: Standard and Asthma     Time In: 8:00 AM  Time Out: 8:53 AM  Total Billable Time (timed & untimed codes): 53 minutes    PTA Visit #: 0/5     Subjective     Patient reports: that her tailbone pain is better today and she can tell that therapy has helped. Her pain has decreased in general. Sitting for long periods of time is easier and she can bend over without pain.    She was compliant with home exercise program.  Response to previous treatment: no adverse reactions  Functional change: in progress    Pain: 3/10  Location: bilateral lower back (L>R today)    Objective      Objective Measures updated at progress report unless specified.     Posture:  Patient presents with postural abnormalities which include:               [] Forward Head                                [] Increased Lumbar Lordosis              [] Rounded Shoulder                         [] Flat Back Posture              [] Increased Thoracic Kyphosis        [] Pes Planus              [] Increased Trunk Sway                   [] Valgus knee position              [] Increased Trunk Rotation              [] Varus knee position              [] Increased cervical lordosis            [x] Other:  None Noted     Sensation:    Sensation to light touch over UE's is  [x] Intact [] Impaired [] Defer  Sensation to light touch over LE's is  [x] Intact [] Impaired [] Defer     Reflexes:  Patellar                        [x] Defer [] Normal [] Hyper []  Hypo   Achilles                        [x] Defer [] Normal [] Hyper []  Hypo  Tricep                          [x] Defer [] Normal [] Hyper []  Hypo  Brachioradialis            [x] Defer [] Normal [] Hyper []  Hypo        Gait Analysis: The patient ambulated with the following assistive device: none; the patient presents with the following gait abnormalities: steady gait      Balance  Right   (seconds) Left  (seconds) Norms   Single Leg Stance 10s 10s Less than 4.9 sec high risk  5-6.4 sec increased risk  6.5 or greater low risk                Range of Motion:  Lumbar ROM Right  (spine) Left    Pain/Dysfunction with Movement   Lumbar Flexion (60º) 60 --- -   Lumbar Extension (30º) 30 --- - discomfort only   Lumbar Side Bending (25º) 20 25 Pain on R   Lumbar Rotation WNL WNL        Strength:  L/E MMT Right  (spine) Left Pain/Dysfunction with Movement   Hip Flexion  4+/5 4+/5    Hip Extension  4+/5 4+/5     Hip Abduction  4+/5 4+/5     Knee Extension 5/5 5/5     Knee Flexion 4+/5 4+/5     Ankle DF 5/5 5/5     Ankle PF 5/5 5/5     Ankle Inversion 5/5 5/5     Ankle Eversion 5/5 5/5        Special Tests:    Right  (spine) Left    Lumbar Distraction  [] Positive    [x] Negative ---   Lumbar Compression [] Positive    [x] Negative ---   SLUMP [] Positive    [x] Negative [] Positive    [x] Negative   ORI [] Positive    [x] Negative [] Positive    [x] Negative      Palpation: mild TTP along the R piriformis and B lumbar parapsinals       Treatment     Yaneth received the treatments listed below:      Yaneth received therapeutic exercises to develop strength, endurance, ROM, flexibility, posture, and core stabilization for 53 minutes including:     Re-assessment  Recumbent Bike  x5 min (Level 4)  Shuttle Press Double Legs x30 reps (1 black cord, 1 red cord)  Shuttle Press Single Leg x30 reps each (1 red cord)  TRX Squats 2x10 reps   Standing Deadlifts + GTB 2x10 reps  Side Steps x1 lap on turf + YTB  Supine Dead Bugs x10 reps - hooklying  Supine Piriformis Rocks 5x10s R only  Supine PPT 2x10 reps (3s holds)  Cupping and STM to the Lumbar paraspinals - pillow under hips    Plan for Next Visit:       Supervised modalities after being cleared for contradictions: IFC Electrical Stimulation:  Yaneth received IFC Electrical Stimulation for pain control applied to the Lower Back. Pt received stimulation at 100% scan for 8 minutes post treatment session. Yaneth tolderated treatment well without any adverse effects.  LE's elevated on wedge for comfort.    Patient received a hot pack for 8 minutes to the lower back post treatment session in combination with IFC-electrical stimulation to assist with pain relief. Prone lying for comfort with pillow under hips.      *A portion of this treatment session is provided with the assistance of a skilled rehbailitation technician under the supervision of a licensed physical therapist.    Patient Education and Home Exercises       Education provided:   Home exercise program review  Post Exercise Soreness  Anatomy/Physiology of the Lumbar Spine and the surrounding musculature    Written Home Exercises Provided: Pt instructed to continue prior HEP. Exercises were reviewed and Yaneth was able to demonstrate them prior to the end of the session.  Yaneth demonstrated good  understanding of the education provided. See Electronic Medical Record under Patient Instructions for exercises provided during therapy sessions    Assessment     Upon re-assessment, noted patient to demonstrate improved LE strength without exacerbation of symptoms. Pain remains limiting at times but has improved significantly since beginning therapy. Patient is able to perform functional  activities such as walking, standing, and sitting for at least 30 minutes without increased symptoms. It is recommended that the patient continue with formal physical therapy to further address symptoms.    Yaneth Is progressing well towards her goals.   Patient prognosis is Good.     Patient will continue to benefit from skilled outpatient physical therapy to address the deficits listed in the problem list box on initial evaluation, provide pt/family education and to maximize pt's level of independence in the home and community environment.     Patient's spiritual, cultural and educational needs considered and pt agreeable to plan of care and goals.     Anticipated barriers to physical therapy: none    Goals:   Reviewed: 10/14/2024    Short Term Goals: In 4 weeks  Progress Date   1.Patient to be educated on HEP. [] Progressing  [x] MET   [] Not MET   [] Not tested  11/22/24   2.Patient to increase right side bending range of motion to 25 degrees, in order to improve available range of motion for ADL's.  [x] Progressing  [] MET   [] Not MET  [] Not tested     3.Patient to increase hip abduction strength by 1/2 grade, in order to improve endurance and increase ability to perform all functional activities for increased time.  [] Progressing  [x] MET   [] Not MET  [] Not tested  11/22/24   4.Patient to have pain less than 4/10 at worst, to improve QOL. [x] Progressing  [] MET   [] Not MET  [] Not tested     5.Patient to improve score on the FOTO, to improve QOL. [] Progressing  [x] MET   [] Not MET  [] Not tested  11/13/24      Long Term Goals: In 8 weeks Progress Date   1.Patient to improve score on the FOTO predicted score or better, to improve QOL. [x] Progressing  [] MET   [] Not MET  [] Not tested     2. Patient to increase LE strength, especially into hip extension to at least 4+/5 or greater, in order to improve endurance and increase ability to perform all functional activities for increased time. []  Progressing  [x] MET   [] Not MET  [] Not tested  11/22/24   3. Patient to have decreased pain to 2/10 at worst, to improve QOL. [x] Progressing  [] MET   [] Not MET  [] Not tested     5. Patient to perform daily activities including sitting and standing for at least 30 minutes without increased symptoms. [] Progressing  [x] MET   [] Not MET  [] Not tested  11/22/24     Plan     Continue with established POC for improved ability to perform ADL's    Cally Medina, PT, DPT, Cert. DN

## 2024-11-22 ENCOUNTER — CLINICAL SUPPORT (OUTPATIENT)
Dept: REHABILITATION | Facility: HOSPITAL | Age: 19
End: 2024-11-22
Payer: MEDICAID

## 2024-11-22 DIAGNOSIS — R29.898 DECREASED STRENGTH OF LOWER EXTREMITY: ICD-10-CM

## 2024-11-22 DIAGNOSIS — R68.89 DECREASED FUNCTIONAL ACTIVITY TOLERANCE: ICD-10-CM

## 2024-11-22 DIAGNOSIS — M53.86 DECREASED ROM OF LUMBAR SPINE: Primary | ICD-10-CM

## 2024-11-22 PROCEDURE — 97110 THERAPEUTIC EXERCISES: CPT | Mod: PN

## 2024-11-25 ENCOUNTER — CLINICAL SUPPORT (OUTPATIENT)
Dept: REHABILITATION | Facility: HOSPITAL | Age: 19
End: 2024-11-25
Payer: MEDICAID

## 2024-11-25 DIAGNOSIS — M53.86 DECREASED ROM OF LUMBAR SPINE: Primary | ICD-10-CM

## 2024-11-25 DIAGNOSIS — R68.89 DECREASED FUNCTIONAL ACTIVITY TOLERANCE: ICD-10-CM

## 2024-11-25 DIAGNOSIS — R29.898 DECREASED STRENGTH OF LOWER EXTREMITY: ICD-10-CM

## 2024-11-25 PROCEDURE — 97110 THERAPEUTIC EXERCISES: CPT | Mod: PN

## 2024-11-25 NOTE — PROGRESS NOTES
[facility]   Physical Therapy Treatment Note     Name: Yaneth Roman  Clinic Number: [id number]    Therapy Diagnosis:   Encounter Diagnoses   Name Primary?    Decreased ROM of lumbar spine Yes    Decreased strength of lower extremity     Decreased functional activity tolerance      Physician: Corazon Seymour MD    Visit Date: 11/25/2024    Physician Orders: PT Eval and Treat  Medical Diagnosis from Referral: M54.50 (ICD-10-CM) - Low back pain   Evaluation Date: 10/14/2024  Authorization Period Expiration: 12/31/24  Plan of Care Expiration: 12/14/2024  Progress Note Due: 12/22/24  Visit # / Visits authorized: 10 / 12 (1/1Eval) (re-assessed on 11/22/24)  FOTO: 2/3 (last performed on 10/14/2024, 11/13/24)     Precautions: Standard and Asthma     Time In: 9:00 AM  Time Out: 9:53 AM  Total Billable Time (timed & untimed codes): 53 minutes    PTA Visit #: 0/5     Subjective     Patient reports: that she did a lot more walking and being on her feet yesterday so her back is a little sore this morning.    She was compliant with home exercise program.  Response to previous treatment: no adverse reactions  Functional change: in progress    Pain: 3/10  Location: bilateral lower back (L>R today)    Objective      Objective Measures updated at progress report unless specified.     Treatment     Yaneth received the treatments listed below:      Yaneth received therapeutic exercises to develop strength, endurance, ROM, flexibility, posture, and core stabilization for 53 minutes including:     Recumbent Bike x5 min (Level 3)  Shuttle Press Double Legs x30 reps (1 black cord, 1 red cord)  Shuttle Press Single Leg x30 reps each (1 red cord)  TRX Squats 3x10 reps   Standing Deadlifts + GTB 2x10 reps  Side Steps x3 laps on turf + YTB  Supine Dead Bugs 2x10 reps - hooklying  Supine Piriformis [place]ocks 5x10s R only  Supine PPT 3x10 reps (3s holds)  Supine Pelvic Tilts 2x10 reps B (3s holds) +  BKFO  Side-Lying Open Books B x10 reps each - improved tolerance this date  Cupping and STM to the Lumbar paraspinals - pillow under hips    Plan for Next Visit:       Supervised modalities after being cleared for contradictions: IFC Electrical Stimulation:  Yaneth received IFC Electrical Stimulation for pain control applied to the Lower Back. Pt received stimulation at 100% scan for 8 minutes post treatment session. Yaneth tolderated treatment well without any adverse effects.  LE's elevated on wedge for comfort.    Patient received a hot pack for 8 minutes to the lower back post treatment session in combination with IFC-electrical stimulation to assist with pain relief. Prone lying for comfort with pillow under hips.      *A portion of this treatment session is provided with the assistance of a skilled rehbailitation technician under the supervision of a licensed physical therapist.    Patient Education and Home Exercises       Education provided:   Home exercise program review  Post Exercise Soreness  Anatomy/Physiology of the Lumbar Spine and the surrounding musculature    Written Home Exercises Provided: Pt instructed to continue prior HEP. Exercises were reviewed and Yaneth was able to demonstrate them prior to the end of the session.  Yaneth demonstrated good  understanding of the education provided. See Electronic Medical Record under Patient Instructions for exercises provided during therapy sessions    Assessment     Patient progressed well through exercises this date. Emphasis on core stability and engagement with all exercises for greater protection of the lumbar spine. Encouraged continued performance of Home Exercise Program daily.    Yaneth Is progressing well towards her goals.   Patient prognosis is Good.     Patient will continue to benefit from skilled outpatient physical therapy to address the deficits listed in the problem list box on initial evaluation, provide pt/family education and to  maximize pt's level of independence in the home and community environment.     Patient's spiritual, cultural and educational needs considered and pt agreeable to plan of care and goals.     Anticipated barriers to physical therapy: none    Goals:   Reviewed: 10/14/2024    Short Term Goals: In 4 weeks  Progress Date   1.Patient to be educated on HEP. [] Progressing  [x] MET   [] Not MET   [] Not tested  11/22/24   2.Patient to increase right side bending range of motion to 25 degrees, in order to improve available range of motion for ADL's.  [x] Progressing  [] MET   [] Not MET  [] Not tested     3.Patient to increase hip abduction strength by 1/2 grade, in order to improve endurance and increase ability to perform all functional activities for increased time.  [] Progressing  [x] MET   [] Not MET  [] Not tested  11/22/24   4.Patient to have pain less than 4/10 at worst, to improve QOL. [x] Progressing  [] MET   [] Not MET  [] Not tested     5.Patient to improve score on the FOTO, to improve QOL. [] Progressing  [x] MET   [] Not MET  [] Not tested  11/13/24      Long Term Goals: In 8 weeks Progress Date   1.Patient to improve score on the FOTO predicted score or better, to improve QOL. [x] Progressing  [] MET   [] Not MET  [] Not tested     2. Patient to increase LE strength, especially into hip extension to at least 4+/5 or greater, in order to improve endurance and increase ability to perform all functional activities for increased time. [] Progressing  [x] MET   [] Not MET  [] Not tested  11/22/24   3. Patient to have decreased pain to 2/10 at worst, to improve QOL. [x] Progressing  [] MET   [] Not MET  [] Not tested     5. Patient to perform daily activities including sitting and standing for at least 30 minutes without increased symptoms. [] Progressing  [x] MET   [] Not MET  [] Not tested  11/22/24     Plan     Continue with established POC for improved ability to perform ADL's    Cally Medina, PT, DPT, Cert.  DN

## 2024-11-26 NOTE — PROGRESS NOTES
[facility]   Physical Therapy Treatment Note     Name: Yaneth Roman  Clinic Number: [id number]    Therapy Diagnosis:   Encounter Diagnoses   Name Primary?    Decreased ROM of lumbar spine Yes    Decreased strength of lower extremity     Decreased functional activity tolerance      Physician: Corazon Seymour MD    Visit Date: 11/27/2024    Physician Orders: PT Eval and Treat  Medical Diagnosis from Referral: M54.50 (ICD-10-CM) - Low back pain   Evaluation Date: 10/14/2024  Authorization Period Expiration: 12/31/24  Plan of Care Expiration: 12/14/2024  Progress Note Due: 12/22/24  Visit # / Visits authorized: 11 / 12 (1/1Eval) (re-assessed on 11/22/24)  FOTO: 2/3 (last performed on 10/14/2024, 11/13/24)     Precautions: Standard and Asthma     Time In: 9:00 AM  Time Out: 9:53 AM  Total Billable Time (timed & untimed codes): 53 minutes    PTA Visit #: 0/5     Subjective     Patient reports: that she is doing pretty good this morning overall. Only having a little pain so far.    She was compliant with home exercise program.  Response to previous treatment: mild soreness that improved over time  Functional change: in progress    Pain: 2/10  Location: bilateral lower back (L>R today)    Objective      Objective Measures updated at progress report unless specified.     Treatment     Yaneth received the treatments listed below:      Yaneth received therapeutic exercises to develop strength, endurance, ROM, flexibility, posture, and core stabilization for 53 minutes including:     Recumbent Bike x5 min (Level 3)  Shuttle Press Double Legs x30 reps (1 black cord, 1 red cord)  Shuttle Press Single Leg x30 reps each (1 red cord)  TRX Squats 3x10 reps   Standing Deadlifts + GTB 2x10 reps  Side Steps x3 laps on turf + RTB  Supine Dead Bugs 2x10 reps - hooklying  Supine Piriformis [place] 5x10s R only  Supine PPT 3x10 reps (3s holds)  Supine Pelvic Tilts 2x10 reps B (3s holds) + BKFO  Side-Lying  Open Books B x10 reps each - improved tolerance this date  Cupping and STM to the Lumbar paraspinals - pillow under hips    Plan for Next Visit:       Supervised modalities after being cleared for contradictions: IFC Electrical Stimulation:  Yaneth received IFC Electrical Stimulation for pain control applied to the Lower Back. Pt received stimulation at 100% scan for 8 minutes post treatment session. Yaneth tolderated treatment well without any adverse effects.  LE's elevated on wedge for comfort.    Patient received a hot pack for 8 minutes to the lower back post treatment session in combination with IFC-electrical stimulation to assist with pain relief. Prone lying for comfort with pillow under hips.      *A portion of this treatment session is provided with the assistance of a skilled rehbailitation technician under the supervision of a licensed physical therapist.    Patient Education and Home Exercises       Education provided:   Home exercise program review  Post Exercise Soreness  Anatomy/Physiology of the Lumbar Spine and the surrounding musculature    Written Home Exercises Provided: Pt instructed to continue prior HEP. Exercises were reviewed and Yaneth was able to demonstrate them prior to the end of the session.  Yaneth demonstrated good  understanding of the education provided. See Electronic Medical Record under Patient Instructions for exercises provided during therapy sessions    Assessment     Patient tolerated therapy well overall today without reported exacerbation of symptoms. Cupping/STM to the lumbar paraspinals continues to feel beneficial. Discharge likely next week.    Yaneth Is progressing well towards her goals.   Patient prognosis is Good.     Patient will continue to benefit from skilled outpatient physical therapy to address the deficits listed in the problem list box on initial evaluation, provide pt/family education and to maximize pt's level of independence in the home and community  environment.     Patient's spiritual, cultural and educational needs considered and pt agreeable to plan of care and goals.     Anticipated barriers to physical therapy: none    Goals:   Reviewed: 10/14/2024    Short Term Goals: In 4 weeks  Progress Date   1.Patient to be educated on HEP. [] Progressing  [x] MET   [] Not MET   [] Not tested  11/22/24   2.Patient to increase right side bending range of motion to 25 degrees, in order to improve available range of motion for ADL's.  [x] Progressing  [] MET   [] Not MET  [] Not tested     3.Patient to increase hip abduction strength by 1/2 grade, in order to improve endurance and increase ability to perform all functional activities for increased time.  [] Progressing  [x] MET   [] Not MET  [] Not tested  11/22/24   4.Patient to have pain less than 4/10 at worst, to improve QOL. [x] Progressing  [] MET   [] Not MET  [] Not tested     5.Patient to improve score on the FOTO, to improve QOL. [] Progressing  [x] MET   [] Not MET  [] Not tested  11/13/24      Long Term Goals: In 8 weeks Progress Date   1.Patient to improve score on the FOTO predicted score or better, to improve QOL. [x] Progressing  [] MET   [] Not MET  [] Not tested     2. Patient to increase LE strength, especially into hip extension to at least 4+/5 or greater, in order to improve endurance and increase ability to perform all functional activities for increased time. [] Progressing  [x] MET   [] Not MET  [] Not tested  11/22/24   3. Patient to have decreased pain to 2/10 at worst, to improve QOL. [x] Progressing  [] MET   [] Not MET  [] Not tested     5. Patient to perform daily activities including sitting and standing for at least 30 minutes without increased symptoms. [] Progressing  [x] MET   [] Not MET  [] Not tested  11/22/24     Plan     Continue with established POC for improved ability to perform ADL's    Cally Medina, PT, DPT, Cert. DN

## 2024-11-27 ENCOUNTER — CLINICAL SUPPORT (OUTPATIENT)
Dept: REHABILITATION | Facility: HOSPITAL | Age: 19
End: 2024-11-27
Payer: MEDICAID

## 2024-11-27 DIAGNOSIS — R29.898 DECREASED STRENGTH OF LOWER EXTREMITY: ICD-10-CM

## 2024-11-27 DIAGNOSIS — M53.86 DECREASED ROM OF LUMBAR SPINE: Primary | ICD-10-CM

## 2024-11-27 DIAGNOSIS — R68.89 DECREASED FUNCTIONAL ACTIVITY TOLERANCE: ICD-10-CM

## 2024-11-27 PROCEDURE — 97110 THERAPEUTIC EXERCISES: CPT | Mod: PN

## 2024-12-05 NOTE — PROGRESS NOTES
[facility]   Physical Therapy Treatment Note, Re-assessment, and Discharge Summary     Name: Yaneth Roman  Clinic Number: [id number]    Therapy Diagnosis:   Encounter Diagnoses   Name Primary?    Decreased ROM of lumbar spine Yes    Decreased strength of lower extremity     Decreased functional activity tolerance      Physician: Corazon Seymour MD    Visit Date: 12/6/2024    Physician Orders: PT Eval and Treat  Medical Diagnosis from Referral: M54.50 (ICD-10-CM) - Low back pain   Evaluation Date: 10/14/2024  Authorization Period Expiration: 12/31/24  Plan of Care Expiration: 12/14/2024  Progress Note Due: 12/22/24  Visit # / Visits authorized: 12 / 12 (1/1Eval) (re-assessed on 11/22/24) (re-assessed on 12/6/24)  FOTO: 2/3 (last performed on 10/14/2024, 11/13/24)     Precautions: Standard and Asthma     Time In: 11:00 AM  Time Out: 11:53 AM  Total Billable Time (timed & untimed codes): 30 minutes    PTA Visit #: 0/5     Date of Last visit: 12/6/24  Total Visits Received: 13    Subjective     Patient reports: that she is not in nearly as much pain as she used to be. Feels like therapy has been really beneficial since starting.     She was compliant with home exercise program.  Response to previous treatment: mild soreness that improved over time  Functional change: decreased pain    Pain: 2/10  Location: bilateral lower back (L>R today)    Objective      Objective Measures updated at progress report unless specified.     Posture:  Patient presents with postural abnormalities which include:               [] Forward Head                                [] Increased Lumbar Lordosis              [] Rounded Shoulder                         [] Flat Back Posture              [] Increased Thoracic Kyphosis        [] Pes Planus              [] Increased Trunk Sway                   [] Valgus knee position              [] Increased Trunk Rotation              [] Varus knee position               [] Increased cervical lordosis            [x] Other: None Noted     Sensation:    Sensation to light touch over UE's is  [x] Intact [] Impaired [] Defer  Sensation to light touch over LE's is  [x] Intact [] Impaired [] Defer     Reflexes:  Patellar                        [x] Defer [] Normal [] Hyper []  Hypo   Achilles                        [x] Defer [] Normal [] Hyper []  Hypo  Tricep                          [x] Defer [] Normal [] Hyper []  Hypo  Brachioradialis            [x] Defer [] Normal [] Hyper []  Hypo        Gait Analysis: The patient ambulated with the following assistive device: none; the patient presents with the following gait abnormalities: steady gait      Balance  Right   (seconds) Left  (seconds) Norms   Single Leg Stance 10s 10s Less than 4.9 sec high risk  5-6.4 sec increased risk  6.5 or greater low risk                Range of Motion:  Lumbar ROM Right  (spine) Left    Pain/Dysfunction with Movement   Lumbar Flexion (60º) 60 --- -   Lumbar Extension (30º) 30 --- - discomfort only   Lumbar Side Bending (25º) 25 25 -   Lumbar Rotation WNL WNL        Strength:  L/E MMT Right  (spine) Left Pain/Dysfunction with Movement   Hip Flexion  4+/5 4+/5     Hip Extension  4+/5 4+/5     Hip Abduction  4+/5 4+/5     Knee Extension 5/5 5/5     Knee Flexion 5/5 5/5     Ankle DF 5/5 5/5     Ankle PF 5/5 5/5     Ankle Inversion 5/5 5/5     Ankle Eversion 5/5 5/5        Special Tests:    Right  (spine) Left    Lumbar Distraction  [] Positive    [x] Negative ---   Lumbar Compression [] Positive    [x] Negative ---   SLUMP [] Positive    [x] Negative [] Positive    [x] Negative   ORI [] Positive    [x] Negative [] Positive    [x] Negative      Palpation: mild TTP along the R piriformis and B lumbar paraspinals    Treatment     Yaneth received the treatments listed below:      Yaneth received therapeutic exercises to develop strength, endurance, ROM, flexibility, posture, and core stabilization for 30 minutes  including:     Re-assessment  Recumbent Bike x5 min (Level 3)  Shuttle Press Double Legs x30 reps (1 black cord, 1 red cord)  Shuttle Press Single Leg x30 reps each (1 red cord)  TRX Squats 3x10 reps   Side Steps x2 laps on turf + RTB  Cupping and STM to the Lumbar paraspinals - pillow under hips    Plan for Next Visit:       Supervised modalities after being cleared for contradictions: IFC Electrical Stimulation:  Yaneth received IFC Electrical Stimulation for pain control applied to the Lower Back. Pt received stimulation at 100% scan for 10 minutes post treatment session. Yaneth tolderated treatment well without any adverse effects.  LE's elevated on wedge for comfort.    Patient received a hot pack for 10 minutes to the lower back post treatment session in combination with IFC-electrical stimulation to assist with pain relief. Prone lying for comfort with pillow under hips.      *A portion of this treatment session is provided with the assistance of a skilled rehbailitation technician under the supervision of a licensed physical therapist.    Patient Education and Home Exercises       Education provided:   Home exercise program review  Post Exercise Soreness  Anatomy/Physiology of the Lumbar Spine and the surrounding musculature    Written Home Exercises Provided: Pt instructed to continue prior HEP. Exercises were reviewed and Yaneth was able to demonstrate them prior to the end of the session.  Yaneth demonstrated good  understanding of the education provided. See Electronic Medical Record under Patient Instructions for exercises provided during therapy sessions    Assessment     All goals have been met. Patient is independent with Home Exercise Program at this time. Strength and lumbar range of motion is functional. At this point, the patient is now discharged from skilled outpatient PT services.    Discharge reason: Patient has reached the maximum rehab potential for the present time and Patient has  completed allowable visits authorized by insurance    Discharge FOTO Score: 72    Yaneth Is progressing well towards her goals.   Patient prognosis is Good.     Patient will continue to benefit from skilled outpatient physical therapy to address the deficits listed in the problem list box on initial evaluation, provide pt/family education and to maximize pt's level of independence in the home and community environment.     Patient's spiritual, cultural and educational needs considered and pt agreeable to plan of care and goals.     Anticipated barriers to physical therapy: none    Goals:   Reviewed: 10/14/2024    Short Term Goals: In 4 weeks  Progress Date   1.Patient to be educated on HEP. [] Progressing  [x] MET   [] Not MET   [] Not tested  11/22/24   2.Patient to increase right side bending range of motion to 25 degrees, in order to improve available range of motion for ADL's.  [] Progressing  [x] MET   [] Not MET  [] Not tested  12/6/24   3.Patient to increase hip abduction strength by 1/2 grade, in order to improve endurance and increase ability to perform all functional activities for increased time.  [] Progressing  [x] MET   [] Not MET  [] Not tested  11/22/24   4.Patient to have pain less than 4/10 at worst, to improve QOL. [] Progressing  [x] MET   [] Not MET  [] Not tested  12/6/24   5.Patient to improve score on the FOTO, to improve QOL. [] Progressing  [x] MET   [] Not MET  [] Not tested  11/13/24      Long Term Goals: In 8 weeks Progress Date   1.Patient to improve score on the FOTO predicted score or better, to improve QOL. [] Progressing  [x] MET   [] Not MET  [] Not tested  12/6/24   2. Patient to increase LE strength, especially into hip extension to at least 4+/5 or greater, in order to improve endurance and increase ability to perform all functional activities for increased time. [] Progressing  [x] MET   [] Not MET  [] Not tested  11/22/24   3. Patient to have decreased pain to 2/10 at worst,  to improve QOL. [] Progressing  [x] MET   [] Not MET  [] Not tested  12/6/24   5. Patient to perform daily activities including sitting and standing for at least 30 minutes without increased symptoms. [] Progressing  [x] MET   [] Not MET  [] Not tested  11/22/24     Plan     This patient is discharged from skilled outpatient Physical Therapy services.    Cally Medina, PT, DPT, Cert. DN

## 2024-12-06 ENCOUNTER — CLINICAL SUPPORT (OUTPATIENT)
Dept: REHABILITATION | Facility: HOSPITAL | Age: 19
End: 2024-12-06
Payer: MEDICAID

## 2024-12-06 DIAGNOSIS — M53.86 DECREASED ROM OF LUMBAR SPINE: Primary | ICD-10-CM

## 2024-12-06 DIAGNOSIS — R68.89 DECREASED FUNCTIONAL ACTIVITY TOLERANCE: ICD-10-CM

## 2024-12-06 DIAGNOSIS — R29.898 DECREASED STRENGTH OF LOWER EXTREMITY: ICD-10-CM

## 2024-12-06 PROCEDURE — 97110 THERAPEUTIC EXERCISES: CPT | Mod: PN

## 2024-12-06 NOTE — PLAN OF CARE
[facility]   Physical Therapy Treatment Note, Re-assessment, and Discharge Summary     Name: Yaneth Roman  Clinic Number: [id number]    Therapy Diagnosis:   Encounter Diagnoses   Name Primary?    Decreased ROM of lumbar spine Yes    Decreased strength of lower extremity     Decreased functional activity tolerance      Physician: Corazon Seymour MD    Visit Date: 12/6/2024    Physician Orders: PT Eval and Treat  Medical Diagnosis from Referral: M54.50 (ICD-10-CM) - Low back pain   Evaluation Date: 10/14/2024  Authorization Period Expiration: 12/31/24  Plan of Care Expiration: 12/14/2024  Progress Note Due: 12/22/24  Visit # / Visits authorized: 12 / 12 (1/1Eval) (re-assessed on 11/22/24) (re-assessed on 12/6/24)  FOTO: 2/3 (last performed on 10/14/2024, 11/13/24)     Precautions: Standard and Asthma     Time In: 11:00 AM  Time Out: 11:53 AM  Total Billable Time (timed & untimed codes): 30 minutes    PTA Visit #: 0/5     Date of Last visit: 12/6/24  Total Visits Received: 13    Subjective     Patient reports: that she is not in nearly as much pain as she used to be. Feels like therapy has been really beneficial since starting.     She was compliant with home exercise program.  Response to previous treatment: mild soreness that improved over time  Functional change: decreased pain    Pain: 2/10  Location: bilateral lower back (L>R today)    Objective      Objective Measures updated at progress report unless specified.     Posture:  Patient presents with postural abnormalities which include:               [] Forward Head                                [] Increased Lumbar Lordosis              [] Rounded Shoulder                         [] Flat Back Posture              [] Increased Thoracic Kyphosis        [] Pes Planus              [] Increased Trunk Sway                   [] Valgus knee position              [] Increased Trunk Rotation              [] Varus knee position               [] Increased cervical lordosis            [x] Other: None Noted     Sensation:    Sensation to light touch over UE's is  [x] Intact [] Impaired [] Defer  Sensation to light touch over LE's is  [x] Intact [] Impaired [] Defer     Reflexes:  Patellar                        [x] Defer [] Normal [] Hyper []  Hypo   Achilles                        [x] Defer [] Normal [] Hyper []  Hypo  Tricep                          [x] Defer [] Normal [] Hyper []  Hypo  Brachioradialis            [x] Defer [] Normal [] Hyper []  Hypo        Gait Analysis: The patient ambulated with the following assistive device: none; the patient presents with the following gait abnormalities: steady gait      Balance  Right   (seconds) Left  (seconds) Norms   Single Leg Stance 10s 10s Less than 4.9 sec high risk  5-6.4 sec increased risk  6.5 or greater low risk                Range of Motion:  Lumbar ROM Right  (spine) Left    Pain/Dysfunction with Movement   Lumbar Flexion (60º) 60 --- -   Lumbar Extension (30º) 30 --- - discomfort only   Lumbar Side Bending (25º) 25 25 -   Lumbar Rotation WNL WNL        Strength:  L/E MMT Right  (spine) Left Pain/Dysfunction with Movement   Hip Flexion  4+/5 4+/5     Hip Extension  4+/5 4+/5     Hip Abduction  4+/5 4+/5     Knee Extension 5/5 5/5     Knee Flexion 5/5 5/5     Ankle DF 5/5 5/5     Ankle PF 5/5 5/5     Ankle Inversion 5/5 5/5     Ankle Eversion 5/5 5/5        Special Tests:    Right  (spine) Left    Lumbar Distraction  [] Positive    [x] Negative ---   Lumbar Compression [] Positive    [x] Negative ---   SLUMP [] Positive    [x] Negative [] Positive    [x] Negative   ORI [] Positive    [x] Negative [] Positive    [x] Negative      Palpation: mild TTP along the R piriformis and B lumbar paraspinals    Treatment     Yaneth received the treatments listed below:      Yaneth received therapeutic exercises to develop strength, endurance, ROM, flexibility, posture, and core stabilization for 30 minutes  including:     Re-assessment  Recumbent Bike x5 min (Level 3)  Shuttle Press Double Legs x30 reps (1 black cord, 1 red cord)  Shuttle Press Single Leg x30 reps each (1 red cord)  TRX Squats 3x10 reps   Side Steps x2 laps on turf + RTB  Cupping and STM to the Lumbar paraspinals - pillow under hips    Plan for Next Visit:       Supervised modalities after being cleared for contradictions: IFC Electrical Stimulation:  Yaneth received IFC Electrical Stimulation for pain control applied to the Lower Back. Pt received stimulation at 100% scan for 10 minutes post treatment session. Yaneth tolderated treatment well without any adverse effects.  LE's elevated on wedge for comfort.    Patient received a hot pack for 10 minutes to the lower back post treatment session in combination with IFC-electrical stimulation to assist with pain relief. Prone lying for comfort with pillow under hips.      *A portion of this treatment session is provided with the assistance of a skilled rehbailitation technician under the supervision of a licensed physical therapist.    Patient Education and Home Exercises       Education provided:   Home exercise program review  Post Exercise Soreness  Anatomy/Physiology of the Lumbar Spine and the surrounding musculature    Written Home Exercises Provided: Pt instructed to continue prior HEP. Exercises were reviewed and Yaneth was able to demonstrate them prior to the end of the session.  Yaneth demonstrated good  understanding of the education provided. See Electronic Medical Record under Patient Instructions for exercises provided during therapy sessions    Assessment     All goals have been met. Patient is independent with Home Exercise Program at this time. Strength and lumbar range of motion is functional. At this point, the patient is now discharged from skilled outpatient PT services.    Discharge reason: Patient has reached the maximum rehab potential for the present time and Patient has  completed allowable visits authorized by insurance    Discharge FOTO Score: 72    Yaneth Is progressing well towards her goals.   Patient prognosis is Good.     Patient will continue to benefit from skilled outpatient physical therapy to address the deficits listed in the problem list box on initial evaluation, provide pt/family education and to maximize pt's level of independence in the home and community environment.     Patient's spiritual, cultural and educational needs considered and pt agreeable to plan of care and goals.     Anticipated barriers to physical therapy: none    Goals:   Reviewed: 10/14/2024    Short Term Goals: In 4 weeks  Progress Date   1.Patient to be educated on HEP. [] Progressing  [x] MET   [] Not MET   [] Not tested  11/22/24   2.Patient to increase right side bending range of motion to 25 degrees, in order to improve available range of motion for ADL's.  [] Progressing  [x] MET   [] Not MET  [] Not tested  12/6/24   3.Patient to increase hip abduction strength by 1/2 grade, in order to improve endurance and increase ability to perform all functional activities for increased time.  [] Progressing  [x] MET   [] Not MET  [] Not tested  11/22/24   4.Patient to have pain less than 4/10 at worst, to improve QOL. [] Progressing  [x] MET   [] Not MET  [] Not tested  12/6/24   5.Patient to improve score on the FOTO, to improve QOL. [] Progressing  [x] MET   [] Not MET  [] Not tested  11/13/24      Long Term Goals: In 8 weeks Progress Date   1.Patient to improve score on the FOTO predicted score or better, to improve QOL. [] Progressing  [x] MET   [] Not MET  [] Not tested  12/6/24   2. Patient to increase LE strength, especially into hip extension to at least 4+/5 or greater, in order to improve endurance and increase ability to perform all functional activities for increased time. [] Progressing  [x] MET   [] Not MET  [] Not tested  11/22/24   3. Patient to have decreased pain to 2/10 at worst,  to improve QOL. [] Progressing  [x] MET   [] Not MET  [] Not tested  12/6/24   5. Patient to perform daily activities including sitting and standing for at least 30 minutes without increased symptoms. [] Progressing  [x] MET   [] Not MET  [] Not tested  11/22/24     Plan     This patient is discharged from skilled outpatient Physical Therapy services.    Cally Medina, PT, DPT, Cert. DN